# Patient Record
Sex: FEMALE | URBAN - METROPOLITAN AREA
[De-identification: names, ages, dates, MRNs, and addresses within clinical notes are randomized per-mention and may not be internally consistent; named-entity substitution may affect disease eponyms.]

---

## 2020-03-11 ENCOUNTER — HOSPITAL ENCOUNTER (OUTPATIENT)
Dept: GENERAL RADIOLOGY | Facility: CLINIC | Age: 35
Discharge: HOME | End: 2020-03-11
Attending: PHYSICIAN ASSISTANT | Admitting: PHYSICIAN ASSISTANT
Payer: COMMERCIAL

## 2020-03-11 VITALS — SYSTOLIC BLOOD PRESSURE: 103 MMHG | HEART RATE: 87 BPM | DIASTOLIC BLOOD PRESSURE: 67 MMHG

## 2020-03-11 DIAGNOSIS — G93.2 RAISED INTRACRANIAL PRESSURE: ICD-10-CM

## 2020-03-11 LAB
APPEARANCE CSF: CLEAR
COLOR CSF: COLORLESS
GLUCOSE CSF-MCNC: 63 MG/DL (ref 40–70)
GRAM STN SPEC: NORMAL
PROT CSF-MCNC: 47 MG/DL (ref 15–60)
RBC # CSF MANUAL: 0 /UL (ref 0–2)
RBC # CSF MANUAL: NORMAL /UL (ref 0–2)
SPECIMEN SOURCE: NORMAL
TUBE # CSF: 4 #
WBC # CSF MANUAL: 1 /UL (ref 0–5)
WBC # CSF MANUAL: NORMAL /UL (ref 0–5)

## 2020-03-11 PROCEDURE — 82945 GLUCOSE OTHER FLUID: CPT | Performed by: PHYSICIAN ASSISTANT

## 2020-03-11 PROCEDURE — 89050 BODY FLUID CELL COUNT: CPT | Performed by: PHYSICIAN ASSISTANT

## 2020-03-11 PROCEDURE — 84157 ASSAY OF PROTEIN OTHER: CPT | Performed by: PHYSICIAN ASSISTANT

## 2020-03-11 PROCEDURE — 87015 SPECIMEN INFECT AGNT CONCNTJ: CPT | Performed by: PHYSICIAN ASSISTANT

## 2020-03-11 PROCEDURE — 87205 SMEAR GRAM STAIN: CPT | Performed by: PHYSICIAN ASSISTANT

## 2020-03-11 PROCEDURE — 77003 FLUOROGUIDE FOR SPINE INJECT: CPT

## 2020-03-11 PROCEDURE — 87070 CULTURE OTHR SPECIMN AEROBIC: CPT | Performed by: PHYSICIAN ASSISTANT

## 2020-03-11 RX ORDER — LIDOCAINE HYDROCHLORIDE 10 MG/ML
5 INJECTION, SOLUTION EPIDURAL; INFILTRATION; INTRACAUDAL; PERINEURAL ONCE
Status: DISCONTINUED | OUTPATIENT
Start: 2020-03-11 | End: 2020-03-12 | Stop reason: HOSPADM

## 2020-03-11 RX ORDER — LIDOCAINE HYDROCHLORIDE 10 MG/ML
INJECTION, SOLUTION EPIDURAL; INFILTRATION; INTRACAUDAL; PERINEURAL
Status: DISCONTINUED
Start: 2020-03-11 | End: 2020-03-12 | Stop reason: HOSPADM

## 2020-03-11 NOTE — PROGRESS NOTES
Pt was here today for a Lumbar Puncture under fluoroscopy guidance. Procedure performed by BAILEE YU. Procedure went well and was tolerated well and 33 Cc's of clear CSF was removed and sent to lab for testing. Pt recovered in radiology for 1 hour and remained stable. Pt was discharged in satisfactory condition and was driven home by family.

## 2020-03-16 LAB
BACTERIA SPEC CULT: NO GROWTH
SPECIMEN SOURCE: NORMAL